# Patient Record
Sex: FEMALE | Race: WHITE | HISPANIC OR LATINO
[De-identification: names, ages, dates, MRNs, and addresses within clinical notes are randomized per-mention and may not be internally consistent; named-entity substitution may affect disease eponyms.]

---

## 2017-03-08 ENCOUNTER — OTHER (OUTPATIENT)
Age: 37
End: 2017-03-08

## 2017-05-24 ENCOUNTER — APPOINTMENT (OUTPATIENT)
Dept: OBGYN | Facility: CLINIC | Age: 37
End: 2017-05-24

## 2017-06-19 ENCOUNTER — RX RENEWAL (OUTPATIENT)
Age: 37
End: 2017-06-19

## 2017-07-19 ENCOUNTER — APPOINTMENT (OUTPATIENT)
Dept: OBGYN | Facility: CLINIC | Age: 37
End: 2017-07-19

## 2017-07-19 VITALS
WEIGHT: 156 LBS | SYSTOLIC BLOOD PRESSURE: 111 MMHG | HEIGHT: 66 IN | BODY MASS INDEX: 25.07 KG/M2 | DIASTOLIC BLOOD PRESSURE: 77 MMHG

## 2017-07-19 DIAGNOSIS — R21 RASH AND OTHER NONSPECIFIC SKIN ERUPTION: ICD-10-CM

## 2017-07-19 RX ORDER — FLUCONAZOLE 150 MG/1
150 TABLET ORAL
Qty: 2 | Refills: 0 | Status: ACTIVE | COMMUNITY
Start: 2017-07-19 | End: 1900-01-01

## 2017-07-20 LAB
CANDIDA VAG CYTO: NOT DETECTED
G VAGINALIS+PREV SP MTYP VAG QL MICRO: NOT DETECTED
HPV HIGH+LOW RISK DNA PNL CVX: NEGATIVE
T VAGINALIS VAG QL WET PREP: NOT DETECTED

## 2017-07-24 LAB — CYTOLOGY CVX/VAG DOC THIN PREP: NORMAL

## 2018-07-23 ENCOUNTER — APPOINTMENT (OUTPATIENT)
Dept: OBGYN | Facility: CLINIC | Age: 38
End: 2018-07-23
Payer: COMMERCIAL

## 2018-07-23 VITALS
BODY MASS INDEX: 25.39 KG/M2 | WEIGHT: 158 LBS | HEIGHT: 66 IN | SYSTOLIC BLOOD PRESSURE: 112 MMHG | DIASTOLIC BLOOD PRESSURE: 78 MMHG

## 2018-07-23 DIAGNOSIS — Z30.40 ENCOUNTER FOR SURVEILLANCE OF CONTRACEPTIVES, UNSPECIFIED: ICD-10-CM

## 2018-07-23 DIAGNOSIS — Z30.02 COUNSELING AND INSTRUCTION IN NATURAL FAMILY PLANNING TO AVOID PREGNANCY: ICD-10-CM

## 2018-07-23 DIAGNOSIS — N89.8 OTHER SPECIFIED NONINFLAMMATORY DISORDERS OF VAGINA: ICD-10-CM

## 2018-07-23 PROCEDURE — 99395 PREV VISIT EST AGE 18-39: CPT

## 2018-07-24 LAB
CANDIDA VAG CYTO: NOT DETECTED
G VAGINALIS+PREV SP MTYP VAG QL MICRO: NOT DETECTED
HPV HIGH+LOW RISK DNA PNL CVX: NOT DETECTED
T VAGINALIS VAG QL WET PREP: NOT DETECTED

## 2018-08-23 LAB — CYTOLOGY CVX/VAG DOC THIN PREP: NORMAL

## 2018-08-27 ENCOUNTER — ASOB RESULT (OUTPATIENT)
Age: 38
End: 2018-08-27

## 2018-08-27 ENCOUNTER — APPOINTMENT (OUTPATIENT)
Dept: OBGYN | Facility: CLINIC | Age: 38
End: 2018-08-27
Payer: COMMERCIAL

## 2018-08-27 VITALS — HEIGHT: 66 IN | BODY MASS INDEX: 25.39 KG/M2 | WEIGHT: 158 LBS

## 2018-08-27 PROCEDURE — 81025 URINE PREGNANCY TEST: CPT

## 2018-08-27 PROCEDURE — 58300 INSERT INTRAUTERINE DEVICE: CPT

## 2018-08-27 PROCEDURE — 76830 TRANSVAGINAL US NON-OB: CPT

## 2018-09-24 ENCOUNTER — APPOINTMENT (OUTPATIENT)
Dept: OBGYN | Facility: CLINIC | Age: 38
End: 2018-09-24
Payer: COMMERCIAL

## 2018-09-24 VITALS
WEIGHT: 158 LBS | BODY MASS INDEX: 25.39 KG/M2 | SYSTOLIC BLOOD PRESSURE: 128 MMHG | DIASTOLIC BLOOD PRESSURE: 77 MMHG | HEIGHT: 66 IN

## 2018-09-24 PROCEDURE — 99213 OFFICE O/P EST LOW 20 MIN: CPT

## 2018-12-03 ENCOUNTER — APPOINTMENT (OUTPATIENT)
Dept: OBGYN | Facility: CLINIC | Age: 38
End: 2018-12-03
Payer: COMMERCIAL

## 2018-12-03 VITALS
DIASTOLIC BLOOD PRESSURE: 85 MMHG | WEIGHT: 163 LBS | HEIGHT: 66 IN | SYSTOLIC BLOOD PRESSURE: 143 MMHG | BODY MASS INDEX: 26.2 KG/M2

## 2018-12-03 DIAGNOSIS — N64.52 NIPPLE DISCHARGE: ICD-10-CM

## 2018-12-03 DIAGNOSIS — N64.4 MASTODYNIA: ICD-10-CM

## 2018-12-03 LAB
HCG UR QL: NEGATIVE
QUALITY CONTROL: YES

## 2018-12-03 PROCEDURE — 81025 URINE PREGNANCY TEST: CPT

## 2018-12-03 PROCEDURE — 99213 OFFICE O/P EST LOW 20 MIN: CPT

## 2018-12-05 ENCOUNTER — APPOINTMENT (OUTPATIENT)
Dept: OBGYN | Facility: CLINIC | Age: 38
End: 2018-12-05
Payer: COMMERCIAL

## 2018-12-05 PROCEDURE — 36415 COLL VENOUS BLD VENIPUNCTURE: CPT

## 2018-12-06 LAB
PROLACTIN SERPL-MCNC: 7 NG/ML
TSH SERPL-ACNC: 2.43 UIU/ML

## 2020-09-30 ENCOUNTER — APPOINTMENT (OUTPATIENT)
Dept: OBGYN | Facility: CLINIC | Age: 40
End: 2020-09-30
Payer: COMMERCIAL

## 2020-09-30 VITALS
WEIGHT: 163 LBS | BODY MASS INDEX: 26.2 KG/M2 | HEIGHT: 66 IN | DIASTOLIC BLOOD PRESSURE: 82 MMHG | SYSTOLIC BLOOD PRESSURE: 123 MMHG

## 2020-09-30 PROCEDURE — 99396 PREV VISIT EST AGE 40-64: CPT

## 2020-09-30 NOTE — HISTORY OF PRESENT ILLNESS
[Patient reported mammogram was normal] : Patient reported mammogram was normal [Patient reported PAP Smear was normal] : Patient reported PAP Smear was normal [TextBox_4] : 40 year-old patient status post IUD in 2018 year for gynecological exam,has no complaints\par  [Mammogramdate] : 8/15/2018 [PapSmeardate] : 7/23/2018

## 2020-09-30 NOTE — PHYSICAL EXAM
[Appropriately responsive] : appropriately responsive [Alert] : alert [No Lymphadenopathy] : no lymphadenopathy [No Acute Distress] : no acute distress [Regular Rate Rhythm] : regular rate rhythm [No Murmurs] : no murmurs [Clear to Auscultation B/L] : clear to auscultation bilaterally [Non-tender] : non-tender [Soft] : soft [Non-distended] : non-distended [No HSM] : No HSM [No Lesions] : no lesions [No Mass] : no mass [Oriented x3] : oriented x3 [Examination Of The Breasts] : a normal appearance [No Masses] : no breast masses were palpable [Labia Majora] : normal [Labia Minora] : normal [Normal] : normal [IUD String] : an IUD string was noted [Uterine Adnexae] : normal

## 2020-10-02 LAB — HPV HIGH+LOW RISK DNA PNL CVX: NOT DETECTED

## 2020-10-05 LAB — CYTOLOGY CVX/VAG DOC THIN PREP: NORMAL

## 2020-10-07 DIAGNOSIS — R92.8 OTHER ABNORMAL AND INCONCLUSIVE FINDINGS ON DIAGNOSTIC IMAGING OF BREAST: ICD-10-CM

## 2022-02-28 ENCOUNTER — APPOINTMENT (OUTPATIENT)
Dept: OBGYN | Facility: CLINIC | Age: 42
End: 2022-02-28
Payer: COMMERCIAL

## 2022-02-28 VITALS
HEIGHT: 66 IN | BODY MASS INDEX: 27.32 KG/M2 | DIASTOLIC BLOOD PRESSURE: 86 MMHG | WEIGHT: 170 LBS | SYSTOLIC BLOOD PRESSURE: 127 MMHG

## 2022-02-28 PROCEDURE — 99396 PREV VISIT EST AGE 40-64: CPT

## 2022-02-28 NOTE — HISTORY OF PRESENT ILLNESS
[FreeTextEntry1] : 41 year-old patient for gynecological exam.\par Patient has an IUD which was placed in August 2018.\par No menses since Mirena [Patient reported mammogram was normal] : Patient reported mammogram was normal [Patient reported PAP Smear was normal] : Patient reported PAP Smear was normal [Mammogramdate] : 2020 [PapSmeardate] : 2020

## 2022-02-28 NOTE — PLAN
[FreeTextEntry1] : Examination and Pap smear was done..\par IUD string was not seen or felt,\par Patient recommended to have transvaginal sonogram for IUD location.\par Prescription given for mammogram and sonogram of the breasts

## 2022-03-01 LAB — HPV HIGH+LOW RISK DNA PNL CVX: NOT DETECTED

## 2022-03-06 LAB — CYTOLOGY CVX/VAG DOC THIN PREP: NORMAL

## 2022-03-14 ENCOUNTER — APPOINTMENT (OUTPATIENT)
Dept: OBGYN | Facility: CLINIC | Age: 42
End: 2022-03-14
Payer: COMMERCIAL

## 2022-03-14 ENCOUNTER — ASOB RESULT (OUTPATIENT)
Age: 42
End: 2022-03-14

## 2022-03-14 PROCEDURE — 76830 TRANSVAGINAL US NON-OB: CPT

## 2022-03-16 ENCOUNTER — NON-APPOINTMENT (OUTPATIENT)
Age: 42
End: 2022-03-16

## 2023-03-13 ENCOUNTER — APPOINTMENT (OUTPATIENT)
Dept: OBGYN | Facility: CLINIC | Age: 43
End: 2023-03-13
Payer: COMMERCIAL

## 2023-03-13 VITALS
BODY MASS INDEX: 23.78 KG/M2 | WEIGHT: 148 LBS | HEIGHT: 66 IN | DIASTOLIC BLOOD PRESSURE: 65 MMHG | SYSTOLIC BLOOD PRESSURE: 95 MMHG

## 2023-03-13 DIAGNOSIS — N92.6 IRREGULAR MENSTRUATION, UNSPECIFIED: ICD-10-CM

## 2023-03-13 PROCEDURE — 99396 PREV VISIT EST AGE 40-64: CPT

## 2023-03-13 PROCEDURE — 99213 OFFICE O/P EST LOW 20 MIN: CPT | Mod: 25

## 2023-03-13 NOTE — HISTORY OF PRESENT ILLNESS
[FreeTextEntry1] : 42 year-old patient for gynecological exam with an IUD which was placed in 2018,\par IUD string was not seen.\par Patient complains of change in her menstrual cycle,\par Patient was amenorrheic but for the last 6 months patient is been having irregular bleeding.  Patient has history of fibroid uterus. [Mammogramdate] : 2022 [PapSmeardate] : 2022 [Irregular Menstrual Interval] : irregular menstrual interval [TextBox_28] : iud, fibroid

## 2023-03-13 NOTE — DISCUSSION/SUMMARY
[FreeTextEntry1] : Examination and Pap smear was done.,\par Patient recommended to have transvaginal sonogram.\par Prescription was given for mammogram and sonogram..

## 2023-03-15 ENCOUNTER — NON-APPOINTMENT (OUTPATIENT)
Age: 43
End: 2023-03-15

## 2023-03-15 ENCOUNTER — ASOB RESULT (OUTPATIENT)
Age: 43
End: 2023-03-15

## 2023-03-15 ENCOUNTER — APPOINTMENT (OUTPATIENT)
Dept: OBGYN | Facility: CLINIC | Age: 43
End: 2023-03-15
Payer: COMMERCIAL

## 2023-03-15 LAB — HPV HIGH+LOW RISK DNA PNL CVX: NOT DETECTED

## 2023-03-15 PROCEDURE — 76830 TRANSVAGINAL US NON-OB: CPT

## 2023-03-20 LAB — CYTOLOGY CVX/VAG DOC THIN PREP: NORMAL

## 2023-03-29 ENCOUNTER — NON-APPOINTMENT (OUTPATIENT)
Age: 43
End: 2023-03-29

## 2023-04-26 ENCOUNTER — NON-APPOINTMENT (OUTPATIENT)
Age: 43
End: 2023-04-26

## 2023-04-26 ENCOUNTER — FORM ENCOUNTER (OUTPATIENT)
Age: 43
End: 2023-04-26

## 2023-04-26 ENCOUNTER — APPOINTMENT (OUTPATIENT)
Dept: ORTHOPEDIC SURGERY | Facility: CLINIC | Age: 43
End: 2023-04-26
Payer: COMMERCIAL

## 2023-04-26 VITALS — HEIGHT: 66 IN | WEIGHT: 148 LBS | BODY MASS INDEX: 23.78 KG/M2

## 2023-04-26 DIAGNOSIS — M79.673 PAIN IN UNSPECIFIED FOOT: ICD-10-CM

## 2023-04-26 PROCEDURE — 73630 X-RAY EXAM OF FOOT: CPT | Mod: RT

## 2023-04-26 PROCEDURE — 99204 OFFICE O/P NEW MOD 45 MIN: CPT

## 2023-04-26 NOTE — ASSESSMENT
[FreeTextEntry1] : recurrent mass with nerve involvement\par discussed surgical and non surgical options\par patient would like to proceed with excision\par will need mri for surgical planning given nerve involvement\par discussed risks of surgery including recurrence\par all questions answered\par \par The pros, cons, risks, benefits, and alternatives have been discussed.  The risks include but are not limited to infection, bleeding, injury to small nerves and blood vessels, pain, stiffness, progression, dvt, PE, amputation and death. Expected recovery time was discussed. All the patient's questions were answered and they would like to proceed.\par

## 2023-04-26 NOTE — PHYSICAL EXAM
[Right] : right foot and ankle [NL (40)] : plantar flexion 40 degrees [NL 30)] : inversion 30 degrees [NL (20)] : eversion 20 degrees [5___] : eversion 5[unfilled]/5 [2+] : dorsalis pedis pulse: 2+ [] : no erythema [FreeTextEntry3] : dorsal foot mass [FreeTextEntry8] : dorsal foot mass w/ mild ttp. mass is mobile [de-identified] : splt all distrib but dec at great toe

## 2023-04-26 NOTE — HISTORY OF PRESENT ILLNESS
[Tingling] : tingling [de-identified] : 04/26/2023: 1 year pain assoc w/ foot mass. multiple aspirations previously but continues to recur. having pain and numbness at times. no prior foot probs. 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

## 2023-04-27 ENCOUNTER — APPOINTMENT (OUTPATIENT)
Dept: MRI IMAGING | Facility: CLINIC | Age: 43
End: 2023-04-27
Payer: COMMERCIAL

## 2023-04-27 PROCEDURE — 73718 MRI LOWER EXTREMITY W/O DYE: CPT | Mod: RT

## 2023-05-31 ENCOUNTER — OUTPATIENT (OUTPATIENT)
Dept: OUTPATIENT SERVICES | Facility: HOSPITAL | Age: 43
LOS: 1 days | End: 2023-05-31
Payer: COMMERCIAL

## 2023-05-31 VITALS
TEMPERATURE: 99 F | HEART RATE: 71 BPM | HEIGHT: 66 IN | DIASTOLIC BLOOD PRESSURE: 67 MMHG | SYSTOLIC BLOOD PRESSURE: 106 MMHG | WEIGHT: 154.98 LBS | RESPIRATION RATE: 16 BRPM

## 2023-05-31 DIAGNOSIS — M67.471 GANGLION, RIGHT ANKLE AND FOOT: ICD-10-CM

## 2023-05-31 DIAGNOSIS — Z01.818 ENCOUNTER FOR OTHER PREPROCEDURAL EXAMINATION: ICD-10-CM

## 2023-05-31 LAB
HCG SERPL-ACNC: <1 MIU/ML — SIGNIFICANT CHANGE UP
HCT VFR BLD CALC: 40 % — SIGNIFICANT CHANGE UP (ref 34.5–45)
HGB BLD-MCNC: 12.8 G/DL — SIGNIFICANT CHANGE UP (ref 11.5–15.5)
MCHC RBC-ENTMCNC: 27.2 PG — SIGNIFICANT CHANGE UP (ref 27–34)
MCHC RBC-ENTMCNC: 32 GM/DL — SIGNIFICANT CHANGE UP (ref 32–36)
MCV RBC AUTO: 85.1 FL — SIGNIFICANT CHANGE UP (ref 80–100)
NRBC # BLD: 0 /100 WBCS — SIGNIFICANT CHANGE UP (ref 0–0)
PLATELET # BLD AUTO: 239 K/UL — SIGNIFICANT CHANGE UP (ref 150–400)
RBC # BLD: 4.7 M/UL — SIGNIFICANT CHANGE UP (ref 3.8–5.2)
RBC # FLD: 13.2 % — SIGNIFICANT CHANGE UP (ref 10.3–14.5)
WBC # BLD: 5.51 K/UL — SIGNIFICANT CHANGE UP (ref 3.8–10.5)
WBC # FLD AUTO: 5.51 K/UL — SIGNIFICANT CHANGE UP (ref 3.8–10.5)

## 2023-05-31 PROCEDURE — 85027 COMPLETE CBC AUTOMATED: CPT

## 2023-05-31 PROCEDURE — 84702 CHORIONIC GONADOTROPIN TEST: CPT

## 2023-05-31 PROCEDURE — G0463: CPT

## 2023-05-31 PROCEDURE — 36415 COLL VENOUS BLD VENIPUNCTURE: CPT

## 2023-05-31 NOTE — H&P PST ADULT - NSICDXPASTMEDICALHX_GEN_ALL_CORE_FT
PAST MEDICAL HISTORY:  Allergic rhinitis, seasonal     Asthma, well controlled     H/O ganglion cyst     HLD (hyperlipidemia)

## 2023-05-31 NOTE — H&P PST ADULT - ASSESSMENT
44 y/o female with right foot ganglion cyst  Planned surgery.- right foot excision  Will obtain medical clearance  cbc, hcg done  Pre op instructions provided  Instructions provided on medications to continue and to take the day morning of surgery

## 2023-05-31 NOTE — H&P PST ADULT - HISTORY OF PRESENT ILLNESS
44 y/o female with right foot cyst for 1 year. Aspirated 2x in past. Keeps coming back and was advised surgery.

## 2023-06-01 ENCOUNTER — TRANSCRIPTION ENCOUNTER (OUTPATIENT)
Age: 43
End: 2023-06-01

## 2023-06-02 ENCOUNTER — APPOINTMENT (OUTPATIENT)
Dept: ORTHOPEDIC SURGERY | Facility: HOSPITAL | Age: 43
End: 2023-06-02
Payer: COMMERCIAL

## 2023-06-02 ENCOUNTER — OUTPATIENT (OUTPATIENT)
Dept: OUTPATIENT SERVICES | Facility: HOSPITAL | Age: 43
LOS: 1 days | End: 2023-06-02
Payer: COMMERCIAL

## 2023-06-02 ENCOUNTER — TRANSCRIPTION ENCOUNTER (OUTPATIENT)
Age: 43
End: 2023-06-02

## 2023-06-02 VITALS
DIASTOLIC BLOOD PRESSURE: 57 MMHG | HEIGHT: 67 IN | TEMPERATURE: 99 F | HEART RATE: 73 BPM | RESPIRATION RATE: 16 BRPM | SYSTOLIC BLOOD PRESSURE: 99 MMHG | OXYGEN SATURATION: 100 % | WEIGHT: 154.1 LBS

## 2023-06-02 VITALS — OXYGEN SATURATION: 100 % | RESPIRATION RATE: 15 BRPM | HEART RATE: 75 BPM

## 2023-06-02 DIAGNOSIS — M67.471 GANGLION, RIGHT ANKLE AND FOOT: ICD-10-CM

## 2023-06-02 PROCEDURE — 27680 RELEASE OF LOWER LEG TENDON: CPT | Mod: 59,RT

## 2023-06-02 PROCEDURE — 88304 TISSUE EXAM BY PATHOLOGIST: CPT | Mod: 26

## 2023-06-02 PROCEDURE — 28090 REMOVAL OF FOOT LESION: CPT | Mod: RT

## 2023-06-02 PROCEDURE — 88304 TISSUE EXAM BY PATHOLOGIST: CPT

## 2023-06-02 RX ORDER — OXYCODONE HYDROCHLORIDE 5 MG/1
5 TABLET ORAL ONCE
Refills: 0 | Status: DISCONTINUED | OUTPATIENT
Start: 2023-06-02 | End: 2023-06-02

## 2023-06-02 RX ORDER — SIMVASTATIN 20 MG/1
1 TABLET, FILM COATED ORAL
Refills: 0 | DISCHARGE

## 2023-06-02 RX ORDER — ONDANSETRON 8 MG/1
4 TABLET, FILM COATED ORAL ONCE
Refills: 0 | Status: COMPLETED | OUTPATIENT
Start: 2023-06-02 | End: 2023-06-02

## 2023-06-02 RX ORDER — CHLORHEXIDINE GLUCONATE 213 G/1000ML
1 SOLUTION TOPICAL ONCE
Refills: 0 | Status: COMPLETED | OUTPATIENT
Start: 2023-06-02 | End: 2023-06-02

## 2023-06-02 RX ORDER — HYDROCODONE BITARTRATE AND ACETAMINOPHEN 7.5; 325 MG/15ML; MG/15ML
1 SOLUTION ORAL
Qty: 20 | Refills: 0
Start: 2023-06-02

## 2023-06-02 RX ORDER — SODIUM CHLORIDE 9 MG/ML
1000 INJECTION, SOLUTION INTRAVENOUS
Refills: 0 | Status: DISCONTINUED | OUTPATIENT
Start: 2023-06-02 | End: 2023-06-02

## 2023-06-02 RX ORDER — LORATADINE 10 MG/1
1 TABLET ORAL
Refills: 0 | DISCHARGE

## 2023-06-02 RX ORDER — HYDROMORPHONE HYDROCHLORIDE 2 MG/ML
0.5 INJECTION INTRAMUSCULAR; INTRAVENOUS; SUBCUTANEOUS
Refills: 0 | Status: DISCONTINUED | OUTPATIENT
Start: 2023-06-02 | End: 2023-06-02

## 2023-06-02 RX ORDER — CEFAZOLIN SODIUM 1 G
2000 VIAL (EA) INJECTION ONCE
Refills: 0 | Status: COMPLETED | OUTPATIENT
Start: 2023-06-02 | End: 2023-06-02

## 2023-06-02 RX ADMIN — CHLORHEXIDINE GLUCONATE 1 APPLICATION(S): 213 SOLUTION TOPICAL at 07:21

## 2023-06-02 RX ADMIN — ONDANSETRON 4 MILLIGRAM(S): 8 TABLET, FILM COATED ORAL at 09:33

## 2023-06-02 NOTE — ASU DISCHARGE PLAN (ADULT/PEDIATRIC) - NS MD DC FALL RISK RISK
For information on Fall & Injury Prevention, visit: [email or web address] OR  [email or web address] OR  https://www.cdc.gov/steadi/patient.html

## 2023-06-02 NOTE — ASU DISCHARGE PLAN (ADULT/PEDIATRIC) - ASU DC SPECIAL INSTRUCTIONSFT
Non weight bearing right lower extremity, Ambulate with crutches or a walker  Elevate right foot on blankets above heart level (toes above the nose) for 1 hour 3 times a day.  Apply ice pack to  foot for 30 minutes every 3 hours daily.  Keep bandage clean and dry daily.  Cover right foot in shower daily with plastic bag & tape ( or cast bag ) to keep dry.  Wear protective shoe on  foot daily  See the Surgeon in the office in 7-10 days.  Call the Surgeon for fever, severe foot pain. heel weight bearing in surgical shoe right lower extremity, Ambulate with crutches or a walker  Elevate right foot on blankets above heart level (toes above the nose) for 1 hour 3 times a day.  Apply ice pack to  foot for 30 minutes every 3 hours daily.  Keep bandage clean and dry daily.  Cover right foot in shower daily with plastic bag & tape ( or cast bag ) to keep dry.  Wear protective shoe on  foot daily  See the Surgeon in the office in 7-10 days.  Call the Surgeon for fever, severe foot pain.

## 2023-06-02 NOTE — PHYSICAL THERAPY INITIAL EVALUATION ADULT - PERTINENT HX OF CURRENT PROBLEM, REHAB EVAL
42 y/o female with right foot cyst for 1 year. Aspirated 2x in past. Keeps coming back and was advised surgery.

## 2023-06-02 NOTE — ASU DISCHARGE PLAN (ADULT/PEDIATRIC) - CARE PROVIDER_API CALL
Vicente Branch  Orthopaedic Surgery  [address] [place] [address]  Phone: [phone]  Fax: [phone]  Follow Up Time: 1 week

## 2023-06-02 NOTE — ASU DISCHARGE PLAN (ADULT/PEDIATRIC) - ACTIVITY LEVEL
No excercise/No heavy lifting/No sports/gym/No weight bearing/Elevate extremity No excercise/No heavy lifting/No sports/gym/Weight bearing as tolerated/Elevate extremity

## 2023-06-02 NOTE — PHYSICAL THERAPY INITIAL EVALUATION ADULT - PLANNED THERAPY INTERVENTIONS, PT EVAL
Pt seen in PACU for transfer and ambulation training WBAT right LE with cane. Pt at safe functional level for d/c to home using cane . Cleared from PT.

## 2023-06-14 ENCOUNTER — APPOINTMENT (OUTPATIENT)
Dept: ORTHOPEDIC SURGERY | Facility: CLINIC | Age: 43
End: 2023-06-14
Payer: COMMERCIAL

## 2023-06-14 VITALS — WEIGHT: 148 LBS | BODY MASS INDEX: 23.78 KG/M2 | HEIGHT: 66 IN

## 2023-06-14 PROBLEM — J30.2 OTHER SEASONAL ALLERGIC RHINITIS: Chronic | Status: ACTIVE | Noted: 2023-05-31

## 2023-06-14 PROBLEM — Z87.39 PERSONAL HISTORY OF OTHER DISEASES OF THE MUSCULOSKELETAL SYSTEM AND CONNECTIVE TISSUE: Chronic | Status: ACTIVE | Noted: 2023-05-31

## 2023-06-14 PROBLEM — E78.5 HYPERLIPIDEMIA, UNSPECIFIED: Chronic | Status: ACTIVE | Noted: 2023-05-31

## 2023-06-14 PROBLEM — J45.909 UNSPECIFIED ASTHMA, UNCOMPLICATED: Chronic | Status: ACTIVE | Noted: 2023-05-31

## 2023-06-14 PROCEDURE — 99024 POSTOP FOLLOW-UP VISIT: CPT

## 2023-06-14 NOTE — PHYSICAL EXAM
[Left] : left foot and ankle [2+] : dorsalis pedis pulse: 2+ [5___] : [facility] 5[unfilled]/5 [] : Sensation present to light touch in all distributions

## 2023-06-14 NOTE — ASSESSMENT
[FreeTextEntry1] : wbat in post op shoe\par keep incision dry\par nsaids prn\par limit walking\par f/up 2 wks for wound check

## 2023-06-14 NOTE — HISTORY OF PRESENT ILLNESS
[de-identified] : 04/26/2023: 1 year pain assoc w/ foot mass. multiple aspirations previously but continues to recur. having pain and numbness at times. no prior foot probs. 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 walking in post op shoe. Patient denies fevers, chills, or drainage.\par  [] : This patient has had an injection before: no [FreeTextEntry1] : RT foot  [de-identified] : 6/2/23 [de-identified] : RT foot ganglion cyst excision

## 2023-06-19 ENCOUNTER — APPOINTMENT (OUTPATIENT)
Dept: OBGYN | Facility: CLINIC | Age: 43
End: 2023-06-19
Payer: COMMERCIAL

## 2023-06-19 ENCOUNTER — ASOB RESULT (OUTPATIENT)
Age: 43
End: 2023-06-19

## 2023-06-19 PROCEDURE — 76830 TRANSVAGINAL US NON-OB: CPT

## 2023-06-21 ENCOUNTER — APPOINTMENT (OUTPATIENT)
Dept: ORTHOPEDIC SURGERY | Facility: CLINIC | Age: 43
End: 2023-06-21
Payer: COMMERCIAL

## 2023-06-21 PROCEDURE — 99024 POSTOP FOLLOW-UP VISIT: CPT

## 2023-06-21 NOTE — PHYSICAL EXAM
[Left] : left foot and ankle [5___] : [facility] 5[unfilled]/5 [2+] : dorsalis pedis pulse: 2+ [] : patient ambulates without assistive device

## 2023-06-21 NOTE — HISTORY OF PRESENT ILLNESS
[de-identified] : 04/26/2023: 1 year pain assoc w/ foot mass. multiple aspirations previously but continues to recur. having pain and numbness at times. no prior foot probs. 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 walking in post op shoe. Patient denies fevers, chills, or drainage.\par \par 06/21/2023: improving. walking in post op shoe. Patient denies fevers, chills, or drainage.\par  [FreeTextEntry1] : RT foot  [] : This patient has had an injection before: no [de-identified] : 6/2/23 [de-identified] : RT foot ganglion cyst excision

## 2023-06-23 ENCOUNTER — NON-APPOINTMENT (OUTPATIENT)
Age: 43
End: 2023-06-23

## 2023-08-02 ENCOUNTER — APPOINTMENT (OUTPATIENT)
Dept: ORTHOPEDIC SURGERY | Facility: CLINIC | Age: 43
End: 2023-08-02
Payer: COMMERCIAL

## 2023-08-02 VITALS — BODY MASS INDEX: 23.78 KG/M2 | WEIGHT: 148 LBS | HEIGHT: 66 IN

## 2023-08-02 DIAGNOSIS — M67.471 GANGLION, RIGHT ANKLE AND FOOT: ICD-10-CM

## 2023-08-02 PROCEDURE — 99024 POSTOP FOLLOW-UP VISIT: CPT

## 2023-08-02 NOTE — HISTORY OF PRESENT ILLNESS
[de-identified] : 04/26/2023: 1 year pain assoc w/ foot mass. multiple aspirations previously but continues to recur. having pain and numbness at times. no prior foot probs. 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 walking in post op shoe. Patient denies fevers, chills, or drainage.  06/21/2023: improving. walking in post op shoe. Patient denies fevers, chills, or drainage.  08/02/2023: improving. some pain w/ shoe wear. Patient denies fevers, chills, or drainage. [] : This patient has had an injection before: no [FreeTextEntry1] : RT foot  [de-identified] : 6/2/23 [de-identified] : RT foot ganglion cyst excision

## 2023-08-02 NOTE — PHYSICAL EXAM
[2+] : dorsalis pedis pulse: 2+ [Right] : right foot and ankle [] : no calf tenderness [NL (40)] : plantar flexion 40 degrees [NL 30)] : inversion 30 degrees [NL (20)] : eversion 20 degrees [5___] : eversion 5[unfilled]/5 [FreeTextEntry8] : no ttp

## 2023-09-01 ENCOUNTER — APPOINTMENT (OUTPATIENT)
Dept: ORTHOPEDIC SURGERY | Facility: CLINIC | Age: 43
End: 2023-09-01
Payer: COMMERCIAL

## 2023-09-01 PROCEDURE — 20611 DRAIN/INJ JOINT/BURSA W/US: CPT | Mod: RT

## 2023-09-01 PROCEDURE — 99203 OFFICE O/P NEW LOW 30 MIN: CPT | Mod: 25

## 2023-09-01 RX ORDER — HYALURONATE SODIUM, STABILIZED 88 MG/4 ML
88 SYRINGE (ML) INTRAARTICULAR
Qty: 1 | Refills: 0 | Status: ACTIVE | OUTPATIENT
Start: 2023-09-01

## 2023-09-01 NOTE — PROCEDURE
[de-identified] : INJECTION / ASPIRATION  RIGHT KNEE  Patient has demonstrated limited relief from NSAIDS, rest, exercises / PT , and after discussion of the risks and benefits, the patient has elected to proceed with a n ULTRASOUND GUIDED corticosteroid injection into the RIGHT SupeLat PF Knee   Confirmed that the patient does not have history of prior adverse reactions, active, infections, or relevant allergies. There was no effusion, erythema, or warmth, and the skin was clear  The skin was sterilized with alcohol. Ethyl Chloride was used as a topical anesthetic. Routine sterile technique.    The KNEE JOINT  was injected utilizing ULTRASOUND GUIDANCEwith KENALOG + LIDOCAINE. The injection was completed without complication and a bandage was applied.  The patient tolerated the procedure well and was given post-injection instructions.Rec: Cold therapy, analgesics, avoid heavy activity.  MEDICATION: Lidocaine 1% 4cc + 10mg of Kenalog  EFFUSION ASPIRATED: 1CC CLEAR YELLOW

## 2023-09-01 NOTE — DISCUSSION/SUMMARY
[de-identified] : XRAYS REVEAL KNEE OSTEOARTHRITIS LITTLE RELIEF FROM NSAIDS , EXERCISES  PATIENT HAS ELECTED TO PROCEED WITH KENALOG INJECTION KNEE   RISKS AND BENEFITS DISCUSSED - VERBAL CONSENT OBTAINED  SEE PROCEDURE NOTE   POST INJECTION INSTRUCTIONS:  INJECTION THERAPY HANDOUT PROVIDED  COLD THERAPY , ANALGESICS PRN  HOME STRETCHING AND EXERCISES QD  -  PFS HANDOUT  ELASTIC KNEE SUPPORT PRN SUPPORTIVE SHOES WITH ARCH SUPPORT   MONOVISC ORDERED

## 2023-09-01 NOTE — PHYSICAL EXAM
[de-identified] : PHYSICAL EXAM RIGHT  KNEE  AROM EXTENSION = 0 FLEXION = 140   SPECIAL TESTS  PATELLAR GRIND = NEG DRAWER  = NEG LACHMAN = NEG MACMURRAY = NEG   MOTOR = GROSSLY INTACT SENSORY = GROSSLY INTACT     [de-identified] : XRAY RIGHT KNEE: 4 views of the knee GRADE 1 OA No obvious fracture or dislocation.  Alignment within normal limits

## 2023-09-01 NOTE — HISTORY OF PRESENT ILLNESS
[de-identified] : LOCATION: RIGHT KNEE PAIN  DURATION: YEARS AGO  HAS GOTTEN WORSE 2 WEEKS AGO  QUALITY: ACHY, THROBBING  CONSTANT  PAIN LEVEL: 8/10  TREATMENTS: REST, ICE, TYLENOL  AGGRAVATING FACTORS: RUNNING, STAIRS, WALKING  ASSOCIATED SYMPTOMS: SWELLING  ASSOCIATED POPPING PT HAD P.T YEARS AGO

## 2023-10-02 ENCOUNTER — APPOINTMENT (OUTPATIENT)
Dept: ORTHOPEDIC SURGERY | Facility: CLINIC | Age: 43
End: 2023-10-02
Payer: COMMERCIAL

## 2023-10-02 DIAGNOSIS — M17.11 UNILATERAL PRIMARY OSTEOARTHRITIS, RIGHT KNEE: ICD-10-CM

## 2023-10-02 PROCEDURE — 99213 OFFICE O/P EST LOW 20 MIN: CPT | Mod: 25

## 2023-10-02 PROCEDURE — 20611 DRAIN/INJ JOINT/BURSA W/US: CPT | Mod: RT

## 2024-04-08 ENCOUNTER — APPOINTMENT (OUTPATIENT)
Dept: OBGYN | Facility: CLINIC | Age: 44
End: 2024-04-08
Payer: COMMERCIAL

## 2024-04-08 VITALS
HEIGHT: 66 IN | WEIGHT: 152 LBS | DIASTOLIC BLOOD PRESSURE: 66 MMHG | SYSTOLIC BLOOD PRESSURE: 103 MMHG | BODY MASS INDEX: 24.43 KG/M2

## 2024-04-08 DIAGNOSIS — Z01.419 ENCOUNTER FOR GYNECOLOGICAL EXAMINATION (GENERAL) (ROUTINE) W/OUT ABNORMAL FINDINGS: ICD-10-CM

## 2024-04-08 DIAGNOSIS — T83.32XA DISPLACEMENT OF INTRAUTERINE CONTRACEPTIVE DEVICE, INITIAL ENCOUNTER: ICD-10-CM

## 2024-04-08 DIAGNOSIS — Z30.431 ENCOUNTER FOR ROUTINE CHECKING OF INTRAUTERINE CONTRACEPTIVE DEVICE: ICD-10-CM

## 2024-04-08 PROCEDURE — 99396 PREV VISIT EST AGE 40-64: CPT

## 2024-04-08 RX ORDER — CLOTRIMAZOLE AND BETAMETHASONE DIPROPIONATE 10; .5 MG/G; MG/G
1-0.05 CREAM TOPICAL
Qty: 1 | Refills: 2 | Status: ACTIVE | COMMUNITY
Start: 2024-04-08 | End: 1900-01-01

## 2024-04-08 NOTE — PLAN
[FreeTextEntry1] : Examination Pap smear was done patient was given prescription for vulvitis, Prescription given for mammogram and sonogram, Patient recommended to have transvaginal sonogram for IUD location and for fibroids, IUD string was not seen or felt on exam

## 2024-04-08 NOTE — HISTORY OF PRESENT ILLNESS
[Mammogramdate] : 2023 [PapSmeardate] : 2023 [Abnormal Quantity] : abnormal quantity [Moderate Bleeding] : moderate bleeding [Regular Cycle Intervals] : periods have been regular [FreeTextEntry1] : 3/28

## 2024-04-10 LAB — HPV HIGH+LOW RISK DNA PNL CVX: NOT DETECTED

## 2024-04-11 LAB — CYTOLOGY CVX/VAG DOC THIN PREP: NORMAL

## 2024-04-17 ENCOUNTER — TRANSCRIPTION ENCOUNTER (OUTPATIENT)
Age: 44
End: 2024-04-17

## 2024-04-17 ENCOUNTER — APPOINTMENT (OUTPATIENT)
Dept: OBGYN | Facility: CLINIC | Age: 44
End: 2024-04-17
Payer: COMMERCIAL

## 2024-04-17 ENCOUNTER — ASOB RESULT (OUTPATIENT)
Age: 44
End: 2024-04-17

## 2024-04-17 PROCEDURE — 76830 TRANSVAGINAL US NON-OB: CPT

## 2024-04-18 ENCOUNTER — NON-APPOINTMENT (OUTPATIENT)
Age: 44
End: 2024-04-18

## 2024-05-20 ENCOUNTER — NON-APPOINTMENT (OUTPATIENT)
Age: 44
End: 2024-05-20

## 2024-06-03 DIAGNOSIS — N64.89 OTHER SPECIFIED DISORDERS OF BREAST: ICD-10-CM

## 2024-06-05 ENCOUNTER — NON-APPOINTMENT (OUTPATIENT)
Age: 44
End: 2024-06-05

## 2024-06-13 ENCOUNTER — NON-APPOINTMENT (OUTPATIENT)
Age: 44
End: 2024-06-13

## 2024-06-13 DIAGNOSIS — M71.30 OTHER BURSAL CYST, UNSPECIFIED SITE: ICD-10-CM

## 2024-06-13 DIAGNOSIS — Z86.39 PERSONAL HISTORY OF OTHER ENDOCRINE, NUTRITIONAL AND METABOLIC DISEASE: ICD-10-CM

## 2024-06-13 DIAGNOSIS — M79.671 PAIN IN RIGHT FOOT: ICD-10-CM

## 2025-05-05 ENCOUNTER — APPOINTMENT (OUTPATIENT)
Dept: OBGYN | Facility: CLINIC | Age: 45
End: 2025-05-05
Payer: COMMERCIAL

## 2025-05-05 ENCOUNTER — NON-APPOINTMENT (OUTPATIENT)
Age: 45
End: 2025-05-05

## 2025-05-05 VITALS
DIASTOLIC BLOOD PRESSURE: 69 MMHG | HEIGHT: 67 IN | SYSTOLIC BLOOD PRESSURE: 109 MMHG | WEIGHT: 152 LBS | BODY MASS INDEX: 23.86 KG/M2

## 2025-05-05 DIAGNOSIS — Z30.431 ENCOUNTER FOR ROUTINE CHECKING OF INTRAUTERINE CONTRACEPTIVE DEVICE: ICD-10-CM

## 2025-05-05 DIAGNOSIS — Z01.419 ENCOUNTER FOR GYNECOLOGICAL EXAMINATION (GENERAL) (ROUTINE) W/OUT ABNORMAL FINDINGS: ICD-10-CM

## 2025-05-05 DIAGNOSIS — R92.8 OTHER ABNORMAL AND INCONCLUSIVE FINDINGS ON DIAGNOSTIC IMAGING OF BREAST: ICD-10-CM

## 2025-05-05 PROCEDURE — 99459 PELVIC EXAMINATION: CPT

## 2025-05-05 PROCEDURE — 99396 PREV VISIT EST AGE 40-64: CPT

## 2025-05-08 LAB
CYTOLOGY CVX/VAG DOC THIN PREP: NORMAL
HPV HIGH+LOW RISK DNA PNL CVX: NOT DETECTED

## 2025-05-21 ENCOUNTER — NON-APPOINTMENT (OUTPATIENT)
Age: 45
End: 2025-05-21

## (undated) DEVICE — DRAPE TOWEL BLUE 17" X 24"

## (undated) DEVICE — SUT MONOSOF 3-0 18" P-12

## (undated) DEVICE — GLV 7.5 PROTEXIS (WHITE)

## (undated) DEVICE — DRSG XEROFORM 5 X 9"

## (undated) DEVICE — DRSG WEBRIL 4"

## (undated) DEVICE — DRAPE IOBAN 23" X 23"

## (undated) DEVICE — PACK LOWER EXTREMITY

## (undated) DEVICE — GLV 7.5 PROTEXIS (BLUE)

## (undated) DEVICE — DRAPE 3/4 SHEET 52X76"

## (undated) DEVICE — VENODYNE/SCD SLEEVE CALF MEDIUM

## (undated) DEVICE — TOURNIQUET ESMARK 6"

## (undated) DEVICE — DRSG ACE BANDAGE 4"

## (undated) DEVICE — DRSG COBAN 4"

## (undated) DEVICE — DRSG STOCKINETTE TUBULAR COTTON 2PLY 6X72"

## (undated) DEVICE — WARMING BLANKET UPPER ADULT